# Patient Record
Sex: FEMALE | Race: WHITE | Employment: FULL TIME | URBAN - METROPOLITAN AREA
[De-identification: names, ages, dates, MRNs, and addresses within clinical notes are randomized per-mention and may not be internally consistent; named-entity substitution may affect disease eponyms.]

---
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
## 2020-10-01 PROBLEM — Z79.899 OTHER LONG TERM (CURRENT) DRUG THERAPY: Status: ACTIVE | Noted: 2020-10-01

## 2020-10-01 PROBLEM — I10 HTN (HYPERTENSION): Status: ACTIVE | Noted: 2020-10-01

## 2020-10-01 PROBLEM — F41.9 ANXIETY: Status: ACTIVE | Noted: 2020-10-01

## 2020-10-01 PROBLEM — F32.A DEPRESSION: Status: ACTIVE | Noted: 2020-10-01

## 2020-10-01 PROBLEM — F17.200 NICOTINE DEPENDENCE: Status: ACTIVE | Noted: 2020-10-01

## 2020-10-09 PROBLEM — K08.89 PAIN, DENTAL: Status: ACTIVE | Noted: 2020-10-09

## 2021-02-03 PROBLEM — J02.0 STREP PHARYNGITIS: Status: ACTIVE | Noted: 2019-05-20

## 2021-09-25 ENCOUNTER — HOSPITAL ENCOUNTER (EMERGENCY)
Age: 21
Discharge: HOME | End: 2021-09-25
Attending: EMERGENCY MEDICINE
Payer: COMMERCIAL

## 2021-09-25 ENCOUNTER — APPOINTMENT (OUTPATIENT)
Dept: GENERAL RADIOLOGY | Age: 21
End: 2021-09-25
Payer: COMMERCIAL

## 2021-09-25 VITALS
TEMPERATURE: 97.4 F | OXYGEN SATURATION: 96 % | HEART RATE: 93 BPM | DIASTOLIC BLOOD PRESSURE: 102 MMHG | RESPIRATION RATE: 16 BRPM | SYSTOLIC BLOOD PRESSURE: 151 MMHG

## 2021-09-25 DIAGNOSIS — S63.501A SPRAIN OF RIGHT WRIST, INITIAL ENCOUNTER: Primary | ICD-10-CM

## 2021-09-25 PROCEDURE — 99283 EMERGENCY DEPT VISIT LOW MDM: CPT

## 2021-09-25 PROCEDURE — 73110 X-RAY EXAM OF WRIST: CPT

## 2021-09-25 ASSESSMENT — ENCOUNTER SYMPTOMS
VOMITING: 0
COUGH: 0
ABDOMINAL PAIN: 0
NAUSEA: 0
EYE REDNESS: 0

## 2021-09-25 ASSESSMENT — PAIN DESCRIPTION - PAIN TYPE: TYPE: ACUTE PAIN

## 2021-09-25 ASSESSMENT — PAIN DESCRIPTION - ORIENTATION: ORIENTATION: RIGHT

## 2021-09-25 ASSESSMENT — PAIN SCALES - GENERAL: PAINLEVEL_OUTOF10: 9

## 2021-09-25 ASSESSMENT — PAIN DESCRIPTION - LOCATION: LOCATION: WRIST

## 2021-09-25 NOTE — ED PROVIDER NOTES
[facility] ED  EMERGENCY DEPARTMENT ENCOUNTER      Pt Name: Quincy Moreno  MRN: [id number]  [place] 2000  Date of evaluation: 9/25/2021  Provider: Sheng Stacy MD    CHIEF COMPLAINT       Chief Complaint   Patient presents with    Wrist Injury     right sided; fell this AM around 0400 while drinking         HISTORY OF PRESENT ILLNESS   (Location/Symptom, Timing/Onset, Context/Setting, Quality, Duration, Modifying Factors, Severity)  Note limiting factors. Quincy Moreno is a 21 y.o. female with no significant PMH who presents to the emergency department with right wrist pain. Patient states around 4am she had been drinking when someone fell on top of her and caused her to land on her right wrist. She doesn't remember if it was direct or a fall on outstretched hand. She has had diffuse wrist pain since, took motrin with minimal relief. HPI    Nursing Notes were reviewed. REVIEW OF SYSTEMS    (2-9 systems for level 4, 10 or more for level 5)     Review of Systems   Constitutional: Negative for chills and fever. HENT: Negative for congestion. Eyes: Negative for redness. Respiratory: Negative for cough. Cardiovascular: Negative for chest pain. Gastrointestinal: Negative for abdominal pain, nausea and vomiting. Genitourinary: Negative for dysuria. Musculoskeletal: Positive for arthralgias. Negative for myalgias. Skin: Negative for rash. Neurological: Negative for headaches. Except as noted above the remainder of the review of systems was reviewed and negative.        PAST MEDICAL HISTORY     Past Medical History:   Diagnosis Date    Depression 10/1/2020    HTN (hypertension) 10/1/2020    controlled with medication         SURGICAL HISTORY       Past Surgical History:   Procedure Laterality Date    CHOLECYSTECTOMY, LAPAROSCOPIC  11/21/2016    Dr. Mrai Salazar       Discharge Medication List as of 9/25/2021  6:36 PM      CONTINUE these medications which have NOT CHANGED    Details   atorvastatin (LIPITOR) 10 MG tablet Take 1 tablet by mouth daily, Disp-30 tablet, R-1Normal      buPROPion (WELLBUTRIN XL) 150 MG extended release tablet Take 1 tablet by mouth every morning, Disp-30 tablet, R-3Normal      propranolol (INDERAL) 20 MG tablet Take 1 tablet by mouth 2 times daily, Disp-180 tablet, R-3Normal      vilazodone HCl (VIIBRYD) 20 MG TABS Take 1 tablet by mouth daily, Disp-90 tablet, R-3Normal      acetaminophen (TYLENOL) 500 MG tablet Take 500 mg by mouth every 6 hours as needed for Pain             ALLERGIES     Amoxicillin    FAMILY HISTORY       Family History   Problem Relation Age of Onset    High Blood Pressure Mother     Other Mother         anxiety/miscarriage    COPD Mother     High Blood Pressure Father     Heart Disease Father     Diabetes Father     High Blood Pressure Sister     High Blood Pressure Brother     Cancer Maternal Grandmother         colon    High Blood Pressure Maternal Grandfather     Diabetes Maternal Grandfather     Heart Disease Maternal Grandfather     Cancer Paternal Grandmother     High Blood Pressure Brother           SOCIAL HISTORY       Social History     Socioeconomic History    Marital status: Single     Spouse name: None    Number of children: None    Years of education: None    Highest education level: None   Occupational History    None   Tobacco Use    Smoking status: Current Every Day Smoker     Packs/day: 1.00     Types: Cigarettes     Start date: 5/28/2017    Smokeless tobacco: Never Used   Vaping Use    Vaping Use: Never used   Substance and Sexual Activity    Alcohol use: No    Drug use: No    Sexual activity: Yes     Partners: Male   Other Topics Concern    None   Social History Narrative    None     Social Determinants of Health     Financial Resource Strain:     Difficulty of Paying Living Expenses:    Food Insecurity:     Worried About Running Out of Food in the Last Year:     [address] in the Last Year:    Transportation Needs:     Lack of Transportation (Medical):  Lack of Transportation (Non-Medical):    Physical Activity:     Days of Exercise per Week:     Minutes of Exercise per Session:    Stress:     Feeling of Stress :    Social Connections:     Frequency of Communication with Friends and Family:     Frequency of Social Gatherings with Friends and Family:     Attends Faith Services:     Active Member of Clubs or Organizations:     Attends Club or Organization Meetings:     Marital Status:    Intimate Partner Violence:     Fear of Current or Ex-Partner:     Emotionally Abused:     Physically Abused:     Sexually Abused:        SCREENINGS                        PHYSICAL EXAM    (up to 7 for level 4, 8 or more for level 5)     ED Triage Vitals [09/25/21 1751]   BP Temp Temp Source Pulse Resp SpO2 Height Weight   (!) 151/102 97.4 °F (36.3 °C) Tympanic 93 16 96 % -- --       Physical Exam  Vitals and nursing note reviewed. Constitutional:       Appearance: Normal appearance. She is well-developed. HENT:      Head: Normocephalic. Right Ear: External ear normal.      Left Ear: External ear normal.      Nose: Nose normal.      Mouth/Throat:      Mouth: Mucous membranes are moist.   Eyes:      Conjunctiva/sclera: Conjunctivae normal.   Cardiovascular:      Rate and Rhythm: Normal rate and regular rhythm. Heart sounds: Normal heart sounds. Pulmonary:      Effort: Pulmonary effort is normal.      Breath sounds: Normal breath sounds. Abdominal:      General: There is no distension. Palpations: Abdomen is soft. Tenderness: There is no abdominal tenderness. Musculoskeletal:         General: Tenderness (bilateral wrist, no specific bony tenderness, no snuffbox tenderness) present. Normal range of motion. Cervical back: Normal range of motion.       Comments: DISPOSITION Decision To Discharge 09/25/2021 06:36:16 PM      PATIENT REFERRED TO:  LUCIANO Sierra - SABA  [facility]. [address].  [phone]    Schedule an appointment as soon as possible for a visit         DISCHARGE MEDICATIONS:  Discharge Medication List as of 9/25/2021  6:36 PM        Controlled Substances Monitoring:     No flowsheet data found.     (Please note that portions of this note were completed with a voice recognition program.  Efforts were made to edit the dictations but occasionally words are mis-transcribed.)    Ellen Vila MD (electronically signed)  Attending Emergency Physician           Eddie Bergman MD  09/25/21 0039

## 2023-09-07 ENCOUNTER — HOSPITAL ENCOUNTER (OUTPATIENT)
Age: 23
Setting detail: SPECIMEN
Discharge: HOME | End: 2023-09-07

## 2023-09-09 LAB
SOURCE: NORMAL
TRICHOMONAS VAGINALI, MOLECULAR: NEGATIVE

## 2023-09-11 LAB
CHLAMYDIA DNA UR QL NAA+PROBE: NEGATIVE
N GONORRHOEA DNA UR QL NAA+PROBE: NEGATIVE
SPECIMEN DESCRIPTION: NORMAL

## 2023-12-21 PROBLEM — R10.13 EPIGASTRIC PAIN: Status: ACTIVE | Noted: 2023-12-21

## 2024-09-17 PROBLEM — F33.0 MAJOR DEPRESSIVE DISORDER, RECURRENT, MILD (HCC): Status: ACTIVE | Noted: 2024-09-17

## 2024-09-17 PROBLEM — F41.0 GENERALIZED ANXIETY DISORDER WITH PANIC ATTACKS: Status: ACTIVE | Noted: 2024-09-17

## 2024-09-17 PROBLEM — F41.1 GENERALIZED ANXIETY DISORDER WITH PANIC ATTACKS: Status: ACTIVE | Noted: 2024-09-17

## 2024-09-17 PROBLEM — F42.2 MIXED OBSESSIONAL THOUGHTS AND ACTS: Status: ACTIVE | Noted: 2024-09-17

## 2024-09-23 ENCOUNTER — HOSPITAL ENCOUNTER (EMERGENCY)
Age: 24
Discharge: HOME | End: 2024-09-24
Payer: COMMERCIAL

## 2024-09-23 ENCOUNTER — APPOINTMENT (OUTPATIENT)
Dept: GENERAL RADIOLOGY | Age: 24
End: 2024-09-23
Payer: COMMERCIAL

## 2024-09-23 DIAGNOSIS — E87.6 HYPOKALEMIA: ICD-10-CM

## 2024-09-23 DIAGNOSIS — E86.0 DEHYDRATION: ICD-10-CM

## 2024-09-23 DIAGNOSIS — R07.9 CHEST PAIN, UNSPECIFIED TYPE: Primary | ICD-10-CM

## 2024-09-23 DIAGNOSIS — A59.9 TRICHOMONAS INFECTION: ICD-10-CM

## 2024-09-23 DIAGNOSIS — N30.01 ACUTE CYSTITIS WITH HEMATURIA: ICD-10-CM

## 2024-09-23 LAB
BASOPHILS # BLD: 0.05 K/UL (ref 0–0.2)
BASOPHILS NFR BLD: 0 % (ref 0–2)
EOSINOPHIL # BLD: 0.1 K/UL (ref 0–0.44)
EOSINOPHILS RELATIVE PERCENT: 1 % (ref 1–4)
ERYTHROCYTE [DISTWIDTH] IN BLOOD BY AUTOMATED COUNT: 12.1 % (ref 11.8–14.4)
HCT VFR BLD AUTO: 39.1 % (ref 36.3–47.1)
HGB BLD-MCNC: 13.6 G/DL (ref 11.9–15.1)
IMM GRANULOCYTES # BLD AUTO: 0.08 K/UL (ref 0–0.3)
IMM GRANULOCYTES NFR BLD: 1 %
LYMPHOCYTES NFR BLD: 2.48 K/UL (ref 1.1–3.7)
LYMPHOCYTES RELATIVE PERCENT: 15 % (ref 24–43)
MCH RBC QN AUTO: 30.5 PG (ref 25.2–33.5)
MCHC RBC AUTO-ENTMCNC: 34.8 G/DL (ref 28.4–34.8)
MCV RBC AUTO: 87.7 FL (ref 82.6–102.9)
MONOCYTES NFR BLD: 0.78 K/UL (ref 0.1–1.2)
MONOCYTES NFR BLD: 5 % (ref 3–12)
NEUTROPHILS NFR BLD: 78 % (ref 36–65)
NEUTS SEG NFR BLD: 13.2 K/UL (ref 1.5–8.1)
NRBC BLD-RTO: 0 PER 100 WBC
PLATELET # BLD AUTO: 364 K/UL (ref 138–453)
PMV BLD AUTO: 9.5 FL (ref 8.1–13.5)
RBC # BLD AUTO: 4.46 M/UL (ref 3.95–5.11)
WBC OTHER # BLD: 16.7 K/UL (ref 3.5–11.3)

## 2024-09-23 PROCEDURE — 99285 EMERGENCY DEPT VISIT HI MDM: CPT

## 2024-09-23 PROCEDURE — 80048 BASIC METABOLIC PNL TOTAL CA: CPT

## 2024-09-23 PROCEDURE — 93005 ELECTROCARDIOGRAM TRACING: CPT

## 2024-09-23 PROCEDURE — 85025 COMPLETE CBC W/AUTO DIFF WBC: CPT

## 2024-09-23 PROCEDURE — 84484 ASSAY OF TROPONIN QUANT: CPT

## 2024-09-23 PROCEDURE — 85379 FIBRIN DEGRADATION QUANT: CPT

## 2024-09-23 PROCEDURE — 71045 X-RAY EXAM CHEST 1 VIEW: CPT

## 2024-09-23 ASSESSMENT — PAIN DESCRIPTION - LOCATION: LOCATION: CHEST

## 2024-09-23 ASSESSMENT — PAIN SCALES - GENERAL: PAINLEVEL_OUTOF10: 8

## 2024-09-23 ASSESSMENT — PAIN - FUNCTIONAL ASSESSMENT: PAIN_FUNCTIONAL_ASSESSMENT: 0-10

## 2024-09-23 ASSESSMENT — PAIN DESCRIPTION - ORIENTATION: ORIENTATION: MID

## 2024-09-23 ASSESSMENT — PAIN DESCRIPTION - DESCRIPTORS: DESCRIPTORS: TINGLING

## 2024-09-24 VITALS
RESPIRATION RATE: 14 BRPM | BODY MASS INDEX: 31.83 KG/M2 | SYSTOLIC BLOOD PRESSURE: 106 MMHG | OXYGEN SATURATION: 99 % | TEMPERATURE: 99.4 F | WEIGHT: 173 LBS | HEART RATE: 93 BPM | DIASTOLIC BLOOD PRESSURE: 88 MMHG | HEIGHT: 62 IN

## 2024-09-24 LAB
ANION GAP SERPL CALCULATED.3IONS-SCNC: 13 MMOL/L (ref 9–16)
BACTERIA URNS QL MICRO: ABNORMAL
BILIRUB UR QL STRIP: NEGATIVE
BUN SERPL-MCNC: 10 MG/DL (ref 6–20)
BUN/CREAT SERPL: 13 (ref 9–20)
CALCIUM SERPL-MCNC: 9.4 MG/DL (ref 8.6–10.4)
CHLORIDE SERPL-SCNC: 98 MMOL/L (ref 98–107)
CLARITY UR: ABNORMAL
CO2 SERPL-SCNC: 25 MMOL/L (ref 20–31)
COLOR UR: YELLOW
CREAT SERPL-MCNC: 0.8 MG/DL (ref 0.5–0.9)
D DIMER PPP FEU-MCNC: <0.27 UG/ML FEU (ref 0–0.59)
EKG ATRIAL RATE: 112 BPM
EKG P AXIS: 42 DEGREES
EKG P-R INTERVAL: 150 MS
EKG Q-T INTERVAL: 302 MS
EKG QRS DURATION: 76 MS
EKG QTC CALCULATION (BAZETT): 412 MS
EKG R AXIS: 83 DEGREES
EKG T AXIS: 21 DEGREES
EKG VENTRICULAR RATE: 112 BPM
EPI CELLS #/AREA URNS HPF: ABNORMAL /HPF (ref 0–25)
GFR, ESTIMATED: >90 ML/MIN/1.73M2
GLUCOSE SERPL-MCNC: 128 MG/DL (ref 74–99)
GLUCOSE UR STRIP-MCNC: NEGATIVE MG/DL
HCG UR QL: NEGATIVE
HGB UR QL STRIP.AUTO: ABNORMAL
KETONES UR STRIP-MCNC: NEGATIVE MG/DL
LEUKOCYTE ESTERASE UR QL STRIP: ABNORMAL
NITRITE UR QL STRIP: NEGATIVE
PH UR STRIP: 6 [PH] (ref 5–9)
POTASSIUM SERPL-SCNC: 3.2 MMOL/L (ref 3.7–5.3)
PROT UR STRIP-MCNC: NEGATIVE MG/DL
RBC #/AREA URNS HPF: ABNORMAL /HPF (ref 0–2)
SODIUM SERPL-SCNC: 136 MMOL/L (ref 136–145)
SP GR UR STRIP: 1.02 (ref 1.01–1.02)
TRICHOMONAS #/AREA URNS HPF: ABNORMAL /[HPF]
TROPONIN I SERPL HS-MCNC: <6 NG/L (ref 0–14)
TROPONIN I SERPL HS-MCNC: <6 NG/L (ref 0–14)
UROBILINOGEN UR STRIP-ACNC: NORMAL EU/DL (ref 0–1)
WBC #/AREA URNS HPF: ABNORMAL /HPF (ref 0–5)

## 2024-09-24 PROCEDURE — 81025 URINE PREGNANCY TEST: CPT

## 2024-09-24 PROCEDURE — 6370000000 HC RX 637 (ALT 250 FOR IP)

## 2024-09-24 PROCEDURE — 93005 ELECTROCARDIOGRAM TRACING: CPT

## 2024-09-24 PROCEDURE — 96360 HYDRATION IV INFUSION INIT: CPT

## 2024-09-24 PROCEDURE — 93010 ELECTROCARDIOGRAM REPORT: CPT | Performed by: FAMILY MEDICINE

## 2024-09-24 PROCEDURE — 81001 URINALYSIS AUTO W/SCOPE: CPT

## 2024-09-24 PROCEDURE — 2580000003 HC RX 258

## 2024-09-24 PROCEDURE — 87086 URINE CULTURE/COLONY COUNT: CPT

## 2024-09-24 RX ORDER — CEFDINIR 300 MG/1
300 CAPSULE ORAL ONCE
Status: COMPLETED | OUTPATIENT
Start: 2024-09-24 | End: 2024-09-24

## 2024-09-24 RX ORDER — CEFDINIR 300 MG/1
300 CAPSULE ORAL 2 TIMES DAILY
Qty: 10 CAPSULE | Refills: 0 | Status: SHIPPED | OUTPATIENT
Start: 2024-09-24 | End: 2024-09-29

## 2024-09-24 RX ORDER — METRONIDAZOLE 500 MG/1
500 TABLET ORAL 2 TIMES DAILY
Qty: 14 TABLET | Refills: 0 | Status: SHIPPED | OUTPATIENT
Start: 2024-09-24 | End: 2024-10-01

## 2024-09-24 RX ORDER — 0.9 % SODIUM CHLORIDE 0.9 %
1000 INTRAVENOUS SOLUTION INTRAVENOUS ONCE
Status: COMPLETED | OUTPATIENT
Start: 2024-09-24 | End: 2024-09-24

## 2024-09-24 RX ADMIN — SODIUM CHLORIDE 1000 ML: 9 INJECTION, SOLUTION INTRAVENOUS at 00:47

## 2024-09-24 RX ADMIN — CEFDINIR 300 MG: 300 CAPSULE ORAL at 01:25

## 2024-09-24 RX ADMIN — POTASSIUM BICARBONATE 50 MEQ: 782 TABLET, EFFERVESCENT ORAL at 00:47

## 2024-09-25 LAB
EKG ATRIAL RATE: 87 BPM
EKG P AXIS: 32 DEGREES
EKG P-R INTERVAL: 186 MS
EKG Q-T INTERVAL: 354 MS
EKG QRS DURATION: 72 MS
EKG QTC CALCULATION (BAZETT): 425 MS
EKG R AXIS: 81 DEGREES
EKG T AXIS: 38 DEGREES
EKG VENTRICULAR RATE: 87 BPM
MICROORGANISM SPEC CULT: NORMAL
SPECIMEN DESCRIPTION: NORMAL

## 2024-09-25 PROCEDURE — 93010 ELECTROCARDIOGRAM REPORT: CPT | Performed by: FAMILY MEDICINE
